# Patient Record
Sex: FEMALE | URBAN - METROPOLITAN AREA
[De-identification: names, ages, dates, MRNs, and addresses within clinical notes are randomized per-mention and may not be internally consistent; named-entity substitution may affect disease eponyms.]

---

## 2024-09-30 ENCOUNTER — TELEPHONE (OUTPATIENT)
Dept: MATERNAL FETAL MEDICINE | Facility: CLINIC | Age: 34
End: 2024-09-30
Payer: COMMERCIAL

## 2024-09-30 DIAGNOSIS — O09.529 ANTEPARTUM MULTIGRAVIDA OF ADVANCED MATERNAL AGE: Primary | ICD-10-CM

## 2024-10-15 DIAGNOSIS — O09.529 ANTEPARTUM MULTIGRAVIDA OF ADVANCED MATERNAL AGE: Primary | ICD-10-CM

## 2024-10-21 ENCOUNTER — TELEPHONE (OUTPATIENT)
Dept: MATERNAL FETAL MEDICINE | Facility: CLINIC | Age: 34
End: 2024-10-21
Payer: COMMERCIAL

## 2024-10-21 PROBLEM — O09.522 MULTIGRAVIDA OF ADVANCED MATERNAL AGE IN SECOND TRIMESTER: Status: ACTIVE | Noted: 2024-10-21

## 2024-10-21 PROBLEM — O09.90 AT HIGH RISK FOR COMPLICATIONS OF INTRAUTERINE PREGNANCY (IUP): Status: ACTIVE | Noted: 2024-10-21

## 2024-10-21 PROBLEM — O99.212 SEVERE OBESITY DUE TO EXCESS CALORIES AFFECTING PREGNANCY IN SECOND TRIMESTER: Status: ACTIVE | Noted: 2024-10-21

## 2024-10-21 PROBLEM — E66.01 SEVERE OBESITY DUE TO EXCESS CALORIES AFFECTING PREGNANCY IN SECOND TRIMESTER: Status: ACTIVE | Noted: 2024-10-21

## 2024-10-21 PROBLEM — O09.42 GRAND MULTIPARITY WITH CURRENT PREGNANCY IN SECOND TRIMESTER: Status: ACTIVE | Noted: 2024-10-21

## 2024-10-21 NOTE — PROGRESS NOTES
Maternal Fetal Medicine New Consult    Subjective:     Patient ID: [id number]    Chief Complaint: MFM Consult w/us  (For AMA )      HPI: 33 y.o.  female  at 13w6d gestation with Estimated Date of Delivery: 25 by LMP, consistent with early US. She is sent for MFM consultation for AMA.     She is of advanced maternal age and will be 34 by the PRANAV.  Patient has regular cycles that are relatively short no more than 28 days.  She had elevated BMI of 37 at initial OB visit.  She is grand multiparous with 4 previous term vaginal deliveries.  She denies any history of postpartum hemorrhage and has never needed a blood transfusion.  Patient was accompanied by .       She denies any personal or family history of aneuploidy or anomalies. She denies any exposure to high fevers, viral rashes, illicit drugs or alcohol in this pregnancy.  She denies any leaking fluid, vaginal bleeding, contractions, decreased fetal movement. Denies headaches, visual disturbances, or epigastric pain.       Pregnancy complications include:  Patient Active Problem List   Diagnosis    Multigravida of advanced maternal age in second trimester    Elevated BMI over 35 affecting pregnancy in second trimester    At high risk for complications of intrauterine pregnancy (IUP)    Grand multiparity with current pregnancy in second trimester       Past Medical History:   Diagnosis Date    Anemia     2014    Anxiety disorder 2014    Postpartum depression     , 2019,       Past Surgical History:   Procedure Laterality Date    ANTERIOR CRUCIATE LIGAMENT REPAIR      BIOPSY OF TONSILS  2008       Family History   Problem Relation Name Age of Onset    Stroke Maternal Grandmother      Diabetes Father      Hypertension Mother      Breast cancer Mother         Social History     Socioeconomic History    Marital status:    Tobacco Use    Smoking status: Never     Passive exposure: Current    Smokeless tobacco: Never   Substance and  "Sexual Activity    Alcohol use: Not Currently    Drug use: Never    Sexual activity: Yes     Partners: Male       Current Outpatient Medications   Medication Sig Dispense Refill    EScitalopram oxalate (LEXAPRO) 20 MG tablet Take 20 mg by mouth.      ondansetron (ZOFRAN-ODT) 4 MG TbDL SMARTSI-2 Tablet(s) Sublingual Every 8 Hours PRN      prenatal no115/iron/folic acid (PRENATAL 19 ORAL) Take by mouth.      aspirin (ECOTRIN) 81 MG EC tablet Take 1 tablet (81 mg total) by mouth 2 (two) times a day. Start after 12 weeks gestation. 60 tablet 10     No current facility-administered medications for this visit.       Review of patient's allergies indicates:   Allergen Reactions    Penicillin Hives        Review of Systems   12 point review of systems conducted, negative except as stated in the history of present illness. See HPI for details.      Objective:     Visit Vitals  /70 (BP Location: Left arm, Patient Position: Sitting)   Pulse 83   Ht 5' 2" (1.575 m)   Wt 90.7 kg (200 lb)   LMP 2024 (Exact Date)   BMI 36.58 kg/m²        Physical Exam  Vitals and nursing note reviewed.   Constitutional:       General: She is not in acute distress.     Appearance: Normal appearance.      Comments: Increased BMI   HENT:      Head: Normocephalic and atraumatic.      Nose: Nose normal. No congestion.      Mouth/Throat:      Pharynx: Oropharynx is clear.   Eyes:      General: No scleral icterus.     Conjunctiva/sclera: Conjunctivae normal.   Cardiovascular:      Rate and Rhythm: Normal rate and regular rhythm.   Pulmonary:      Effort: No respiratory distress.      Breath sounds: Normal breath sounds. No wheezing.   Abdominal:      General: Abdomen is flat.      Palpations: Abdomen is soft.      Tenderness: There is no abdominal tenderness. There is no right CVA tenderness, left CVA tenderness or guarding.      Comments: No CVA tenderness gravid uterus.    Musculoskeletal:         General: Normal range of motion.      " Cervical back: Neck supple.      Right lower leg: No edema.      Left lower leg: No edema.   Skin:     General: Skin is warm.      Findings: No bruising or rash.   Neurological:      General: No focal deficit present.      Mental Status: She is oriented to person, place, and time.      Deep Tendon Reflexes: Reflexes normal.      Comments: Normal reflexes   Psychiatric:         Mood and Affect: Mood normal.         Behavior: Behavior normal.         Thought Content: Thought content normal.         Judgment: Judgment normal.         Assessment/Plan:     33 y.o.  female with IUP at 13w6d    Advanced maternal age  Viable IUP with measurements consistent with established dating on 10/22/24.     I discussed with her that the higher risk of aneuploidy related to advanced maternal age is caused by meiotic nondysjunction.  At this maternal  age, the risk of Down syndrome quoted at 1:353 and the risk of any chromosomal problems quoted at 1:178. She would not consider termination of pregnancy even if anomalies or aneuploidy is detected. She was advised of the screening nature of the Level 2 ultrasound as well as the screening nature of a quad screen to check for the risk of aneuploidy with normal ultrasound and or quad screen testing that would lower the background risk of aneuploidy.        She was counseled on Cell free DNA understanding that it is an enhanced screening test but not a diagnostic test. It assesses risk for common aneuploidies such as Trisomy 13, 18, and 21 by evaluating cell-free fetal DNA in maternal circulation with a false positive rate less than 0.5% for Trisomy 21 and less reliable for Trisomy 13 and Trisomy 18. She declined cell free DNA .      She was advised that the only diagnostic test at this time is genetic amniocentesis.  Benefits and risks of a genetic amniocentesis were discussed. Patient was offered genetic amniocentesis, after thorough counseling on benefits and risks of procedure,  with very remote risk of complications and in some studies no identifiable increased risk, above background risk of spontaneous miscarriage, while some current data suggests risk up to 1 in 800 with early amniocentesis prior to 24 weeks, and remote risk of need for emergency delivery with all the complications of prematurity with amniocentesis after 24 weeks.     I also discussed with them that cell free DNA will not detect other genetic diseases or more subtle chromosomal abnormalities like microdeletions or duplications. The added benefit of doing chromosomal microarray analysis on amniotic fluid is that it would detect clinically relevant deletions or duplications in about 1:60 (1.7%) when the indication is abnormal quad screen or advanced maternal age, and 6.0 % when a structural anomaly is present. The concern about microarray analysis is that it could give uncertain findings in about 1.5-2.0% of cases that would increase anxiety and may require specialized genetic counseling.     In a recent study from 2018, clinically significant chromosomal microarray aberrations were seen in 4.7% of pregnancies with US anomalies (excluding soft markers for aneuploidy), but including patients with isolated FGR and polyhydramnios. The most common abnormality seen with cardiovascular defects was 22q11.21 deletion (DiGeorge-velocardiofacial syndrome), and the most common abnormality among genitourinary malformations was 17q12 deletion (encompassing HFNF1B). Aberrations were present in 13.1% if multiple anomalies, and around 4 % if single ultrasonographic anomaly.     She declined amniocentesis . She is aware of the need for  evaluation.    The higher risk of anomalies associated with advanced maternal age was also addressed. The reassurance obtained by the normal ultrasound and the limitations of ultrasound in checking for anomalies was explained with the need for regular  evaluations.     I recommend targeted  anatomical ultrasound at 18-20 weeks, and fetal growth ultrasound around 32-34 weeks if no other obstetrical risk factors and fetal anatomy scan complete. She was advised to do fetal kick counts 3 times daily and PRN after 24 weeks, with immediate reporting of decreased fetal movements (<10 movements/hr).    Consider delivery at 39-40 weeks, but no later than 40 weeks 6 days.       Elevated BMI  Body mass index is 36.58 kg/m².    I discussed the risk of miscarriage in first trimester, recurrent miscarriages, congenital anomalies, hypertension, diabetes,  labor and the higher risk of  section and the higher risk of fetal demise in-utero. There is also higher risk of for excessive fetal weight and large for gestational age (LGA) fetuses. Mothers with LGA fetuses are at higher risk of prolonged labor,  delivery, shoulder dystocia and birth trauma. LGA neonates are increased risk of fetal hypoxia and intrauterine death, and are at risk to develop diabetes, obesity, metabolic syndrome, asthma and cancer later in life. She was advised of the importance of eating healthy and limiting weight gain to 11-20 lbs during the pregnancy, as optimal in this situation. I recommended low calorie, low fat diet avoiding any additional excessive weight gain. Excess weight gain would be associated with gestational hypertension, gestational diabetes and adverse  outcomes, including fetal demise in utero.    Due to the lack of consistent evidence to indicate  and maternal benefit from early diagnosis and treatment of gestational diabetes mellitus (GDM), the American College of Obstetricians and Gynecologists (ACOG) does not recommend routine screening for GDM before 24 weeks of gestation. ACOG continues to recommend screening for pregestational diabetes mellitus before 24 weeks of gestation, preferably at the onset of prenatal care, in patients with risk factors.    I recommend 1 hour GCT between  24-28 weeks' gestation.    With elevated BMI, start fetal testing starting around 37 weeks.       Importance of working on losing weight after the pregnancy is over, especially before a future pregnancy was discussed. Breastfeeding may be an important tool in reducing the postpartum weight retention. Fetal risks were discussed with short term risk of fetal/ obesity and long term risk of adolescent component of metabolic syndrome.       Grand multiparity  She was advised of the increased risk of malpresentation, abnormal labors, and risk of postpartum hemorrhage and expectant management needs to be done. Consider using prophylactic dose of uterotonic agents after delivery to reduce the risk of postpartum hemorrhage.    Recommend flagging the chart on admission as high risk for postpartum hemorrhage to have blood type and screened and be prepared in the event that that happens.       Preeclampsia prophylaxis  With her risk factors for preeclampsia including  BMI over 30 and maternal age 35 years or older, discussed recommendations for low dose aspirin use to decrease risks for adverse outcomes, including preeclampsia, low birth weight and  delivery. Low-dose aspirin reduced the risk for preeclampsia by 15% in clinical trials and reduced the risk for  birth by 20% and FGR by 20%, and  mortality by around 20%.  After discussing risks/benefits of its use, it was agreed to start asa 81 mg BID, due to multiple risk factors for preeclampsia. After discussing benefits (more effective than daily) vs potential risks (less data on safety with use at delivery), she agreed to start low dose aspirin twice daily and continue until 34 6/7weeks, then decrease to once daily until delivery.. Also, recommend using in all future pregnancies.    Reviewed dating criteria with the patient and with a 5 day discrepancy at 8 weeks consistent with today's measurements, and with a history of short cycles, agreed to  use ultrasound assigned EDC of April 23, 2025.  Discussed with Dr. Huntley who agrees.  Genetic counseling regarding advanced maternal age done.  Patient was not interested in any genetic testing at this time.  We will come for a level 2 scan in about 6 weeks.  After counseling on aspirin daily versus twice a day agreed to do b.i.d. till 34 weeks and then decrease after that to daily.  Importance of healthy diet and proper weight gain recommendations discussed.  Exercise options reviewed and encouraged continued exercise, with the patient uses stationary bike at this time    Follow up in about 6 weeks (around 12/3/2024) for [facility] follow-up, Level 2 scan.     Future Appointments   Date Time Provider Department Center   12/3/2024  8:15 AM ROOM 3, [facility] [place]   12/3/2024  8:30 AM Reginald Gross MD [facility] [place]          Patient was evaluated by LICHA Hillman and Dr. Gross.  Final assessment and recommendations as stated above were made by Dr. Gross.    This note was created with the assistance of [employer] voice recognition software. There may be transcription errors as a result of using this technology, however minimal. Effort has been made to ensure accuracy of transcription, but any obvious errors or omissions should be clarified with the author of the document.

## 2024-10-22 ENCOUNTER — PROCEDURE VISIT (OUTPATIENT)
Dept: MATERNAL FETAL MEDICINE | Facility: CLINIC | Age: 34
End: 2024-10-22
Payer: COMMERCIAL

## 2024-10-22 ENCOUNTER — OFFICE VISIT (OUTPATIENT)
Dept: MATERNAL FETAL MEDICINE | Facility: CLINIC | Age: 34
End: 2024-10-22
Payer: COMMERCIAL

## 2024-10-22 VITALS
SYSTOLIC BLOOD PRESSURE: 113 MMHG | HEART RATE: 83 BPM | DIASTOLIC BLOOD PRESSURE: 70 MMHG | HEIGHT: 62 IN | WEIGHT: 200 LBS | BODY MASS INDEX: 36.8 KG/M2

## 2024-10-22 DIAGNOSIS — O09.529 ANTEPARTUM MULTIGRAVIDA OF ADVANCED MATERNAL AGE: ICD-10-CM

## 2024-10-22 DIAGNOSIS — O99.212 SEVERE OBESITY DUE TO EXCESS CALORIES AFFECTING PREGNANCY IN SECOND TRIMESTER: ICD-10-CM

## 2024-10-22 DIAGNOSIS — O09.522 MULTIGRAVIDA OF ADVANCED MATERNAL AGE IN SECOND TRIMESTER: Primary | ICD-10-CM

## 2024-10-22 DIAGNOSIS — O09.42 GRAND MULTIPARITY WITH CURRENT PREGNANCY IN SECOND TRIMESTER: ICD-10-CM

## 2024-10-22 DIAGNOSIS — E66.01 SEVERE OBESITY DUE TO EXCESS CALORIES AFFECTING PREGNANCY IN SECOND TRIMESTER: ICD-10-CM

## 2024-10-22 DIAGNOSIS — O09.90 AT HIGH RISK FOR COMPLICATIONS OF INTRAUTERINE PREGNANCY (IUP): ICD-10-CM

## 2024-10-22 RX ORDER — ESCITALOPRAM OXALATE 20 MG/1
20 TABLET ORAL
COMMUNITY
Start: 2024-06-17

## 2024-10-22 RX ORDER — ASPIRIN 81 MG/1
81 TABLET ORAL 2 TIMES DAILY
Qty: 60 TABLET | Refills: 10 | Status: SHIPPED | OUTPATIENT
Start: 2024-10-22

## 2024-10-22 RX ORDER — ONDANSETRON 4 MG/1
TABLET, ORALLY DISINTEGRATING ORAL
COMMUNITY
Start: 2024-07-09

## 2024-10-22 RX ORDER — ASPIRIN 81 MG/1
81 TABLET ORAL DAILY
Qty: 30 TABLET | Refills: 10 | Status: SHIPPED | OUTPATIENT
Start: 2024-10-22 | End: 2024-10-22

## 2024-11-11 DIAGNOSIS — E66.01 SEVERE OBESITY DUE TO EXCESS CALORIES AFFECTING PREGNANCY IN SECOND TRIMESTER: ICD-10-CM

## 2024-11-11 DIAGNOSIS — O09.529 ANTEPARTUM MULTIGRAVIDA OF ADVANCED MATERNAL AGE: Primary | ICD-10-CM

## 2024-11-11 DIAGNOSIS — O99.212 SEVERE OBESITY DUE TO EXCESS CALORIES AFFECTING PREGNANCY IN SECOND TRIMESTER: ICD-10-CM

## 2024-11-19 DIAGNOSIS — O09.522 MULTIGRAVIDA OF ADVANCED MATERNAL AGE IN SECOND TRIMESTER: Primary | ICD-10-CM

## 2024-11-21 ENCOUNTER — TELEPHONE (OUTPATIENT)
Dept: MATERNAL FETAL MEDICINE | Facility: CLINIC | Age: 34
End: 2024-11-21
Payer: COMMERCIAL

## 2024-11-21 PROBLEM — O98.519 PARVOVIRUS B19 INFECTION COMPLICATING PREGNANCY: Status: ACTIVE | Noted: 2024-11-21

## 2024-11-21 PROBLEM — B34.3 PARVOVIRUS B19 INFECTION COMPLICATING PREGNANCY: Status: ACTIVE | Noted: 2024-11-21

## 2024-11-21 NOTE — TELEPHONE ENCOUNTER
Pt stated that she does not plan on going due to her glucose test result from Dr Huntley that is coming in next week.

## 2024-11-21 NOTE — PROGRESS NOTES
Maternal Fetal Medicine Follow Up    Subjective:     Patient ID: [id number]    Chief Complaint: M follow up with US      HPI: Amira Kebede is a 34 y.o. female  at 18w2d gestation with Estimated Date of Delivery: 25  who is here for follow-up consultation by MFM.    She is of advanced maternal age and will be 34 by the PRANAV.  She declined any genetic testing during the pregnancy.  She had elevated BMI of 37 at initial OB visit.  She is grand multiparous with 4 previous term vaginal deliveries.  She denies any history of postpartum hemorrhage and has never needed a blood transfusion.  She is on low-dose aspirin BID for preeclampsia prophylaxis.  Patient had positive parvovirus B19 IgG and IgM on 2024.  Patient has had symptoms of the end of the closure and does have resolved.  She reports 2 of her children were diagnosed with with 5th disease around that time.  She denies any symptoms at this time.       Interval history since last MFM visit: None.. She denies any leaking fluid, vaginal bleeding, contractions, decreased fetal movement. Denies headaches, visual disturbances, or epigastric pain.    Pregnancy complications include:   Patient Active Problem List   Diagnosis    Multigravida of advanced maternal age in second trimester    Elevated BMI over 35 affecting pregnancy in second trimester    At high risk for complications of intrauterine pregnancy (IUP)    Grand multiparity with current pregnancy in second trimester    Parvovirus B19 infection complicating pregnancy       No changes to medical, surgical, family, social, or obstetric history.    Medications:  Current Outpatient Medications   Medication Instructions    aspirin (ECOTRIN) 81 mg, Oral, 2 times daily, Start after 12 weeks gestation.    EScitalopram oxalate (LEXAPRO) 20 mg    ondansetron (ZOFRAN-ODT) 4 MG TbDL SMARTSI-2 Tablet(s) Sublingual Every 8 Hours PRN    prenatal no115/iron/folic acid (PRENATAL 19 ORAL) Take by mouth.  "      Review of Systems   12 point review of systems conducted, negative except as stated in the history of present illness. See HPI for details.      Objective:     Visit Vitals  /68 (BP Location: Left arm, Patient Position: Sitting)   Pulse 77   Ht 5' 2" (1.575 m)   Wt 92.5 kg (204 lb)   LMP 2024 (Exact Date)   BMI 37.31 kg/m²        Physical Exam  Vitals and nursing note reviewed.   Constitutional:       Appearance: Normal appearance.      Comments: Increased BMI   HENT:      Head: Normocephalic and atraumatic.      Nose: Nose normal. No congestion.   Cardiovascular:      Rate and Rhythm: Normal rate.   Pulmonary:      Effort: Pulmonary effort is normal.   Skin:     Findings: No rash.   Neurological:      Mental Status: She is alert and oriented to person, place, and time.   Psychiatric:         Mood and Affect: Mood normal.         Behavior: Behavior normal.         Thought Content: Thought content normal.         Judgment: Judgment normal.         Assessment/Plan:     34 y.o.  female with IUP at 18w2d    Advanced maternal age  Viable IUP with measurements consistent with established dating on 10/22/24.   Normal MCA Doppler today 2024.    Reviewed risks with AMA, including risks for PTL, FGR, anomalies not seen, aneuploidy and stillbirth at term. She previously declined amniocentesis . She is aware of need for  evaluation. She previously declined cell free DNA .    I recommend targeted anatomical ultrasound at 18-20 weeks, and fetal growth ultrasound around 32-34 weeks if no other obstetrical risk factors and fetal anatomy scan complete. She was advised to do fetal kick counts 3 times daily and PRN after 24 weeks, with immediate reporting of decreased fetal movements (<10 movements/hr).    Consider delivery at 39-40 weeks, but no later than 40 weeks 6 days.       Parvovirus B19 in pregnancy  Normal MCA Doppler today 2024.    Acute infection with parvovirus during the first " half of pregnancy is associated with spontaneous  (8-17% <20 weeks, and 2-6% after 20 weeks), but not with congenital anomalies. Acute infection is also associated with fetal anemia and hydrops fetalis, though the overall incidence is low. Additionally, most cases of fetal infection resolve spontaneously with no adverse outcomes. Most maternal infections are mild, with most individuals recovering completely from parvovirus B19 infection while requiring only supportive care during the acute phase.    Testing should be performed for patients with symptoms consistent with parvovirus B19 infection or for those with exposure to suspected or confirmed acute infections. Confirmatory testing for fetal infection involves PCR testing for parvovirus B19 DNA of the amniotic fluid. Diagnosis of fetal anemia is made via ultrasound assessment of the peak systolic velocity of the middle cerebral artery. Fetal anemia may be definitively diagnosed and treated through cordocentesis and intrauterine transfusion. Long-term neurodevelopmental outcomes are uncertain in infants with fetal parvovirus B19 infection. However, in the setting of fetal hydrops, the risk of fetal demise and abnormal neurodevelopmental outcomes are likely higher, even when appropriately managed.    Recommendations:  With her IgM+, she should be monitored for fetal infection (see below)  Amniocentesis can be performed for confirmation of fetal infection in cases where maternal infection history is unclear, although this is rarely necessary.  Discussed risks and benefits.  If performed - qualitative parvovirus B19 PCR is recommended.  Patient declined amniocentesis.  Weekly/Biweekly ultrasound surveillance with MCA PSV assessment for 10-12 weeks post exposure.  If no evidence of fetal anemia or hydrops after the period of assessment, return to routine obstetric care is appropriate.  If MCA dopplers are elevated, PUBS may be considered and further  recommendations will be made at that time      Elevated BMI  Body mass index is 37.31 kg/m². With  4 lb gain since last visit, she was advised to follow a healthy low caloric diet. Excess weight gain would be associated with gestational hypertension, gestational diabetes and adverse  outcomes, including fetal demise in utero.    It is important to lose weight after the pregnancy is over, especially before a future pregnancy was discussed. Breastfeeding may be an important tool in reducing the postpartum weight retention. Fetal risks were discussed with short term risk of fetal/ obesity and long term risk of adolescent component of metabolic syndrome.    I recommend 1 hour GCT between 24-28 weeks' gestation.    With elevated BMI, start fetal testing starting around 37 weeks. She was advised to do fetal kick counts 3 times daily and PRN after 24 weeks, with immediate reporting of decreased fetal movements (<10 movements/hr).       Grand multiparity  She was advised of the increased risk of malpresentation, abnormal labors, and risk of postpartum hemorrhage and expectant management needs to be done. Consider using prophylactic dose of uterotonic agents after delivery to reduce the risk of postpartum hemorrhage.    Recommend flagging the chart on admission as high risk for postpartum hemorrhage to have blood type and screened and be prepared in the event that that happens.       Preeclampsia prophylaxis  With her increased risk for preeclampsia, she agreed to continue asa 81 mg BID until 34 6/7 weeks, then decrease to once daily until delivery. Preeclampsia precautions reviewed.       Follow up in about 2 weeks (around 2024) for MCA doppler, Level 2 scan, keep return appointment add MCA to next ultrasound.     Future Appointments   Date Time Provider Department Center   12/3/2024  8:15 AM ROOM 3, [facility]   12/3/2024  8:30 AM Reginald Gross MD [facility] [place]         Patient was counseled of the risk of fetal infection risk of fetal anemia as discussed above.  We will follow MCA Dopplers.  MCA Doppler was normal today and will rechecked again 11 days.  Options of amniocentesis discussed and politely declined at this time.    RANGEL involvement: Patient was evaluated by LICHA Hillman and Dr. Gross.  Final assessment and recommendations as stated above were made by Dr. Gross.    This note was created with the assistance of [employer] voice recognition software. There may be transcription errors as a result of using this technology, however minimal. Effort has been made to ensure accuracy of transcription, but any obvious errors or omissions should be clarified with the author of the document.

## 2024-11-22 ENCOUNTER — PROCEDURE VISIT (OUTPATIENT)
Dept: MATERNAL FETAL MEDICINE | Facility: CLINIC | Age: 34
End: 2024-11-22
Payer: COMMERCIAL

## 2024-11-22 ENCOUNTER — OFFICE VISIT (OUTPATIENT)
Dept: MATERNAL FETAL MEDICINE | Facility: CLINIC | Age: 34
End: 2024-11-22
Payer: COMMERCIAL

## 2024-11-22 VITALS
HEIGHT: 62 IN | HEART RATE: 77 BPM | DIASTOLIC BLOOD PRESSURE: 68 MMHG | SYSTOLIC BLOOD PRESSURE: 106 MMHG | WEIGHT: 204 LBS | BODY MASS INDEX: 37.54 KG/M2

## 2024-11-22 DIAGNOSIS — O09.522 MULTIGRAVIDA OF ADVANCED MATERNAL AGE IN SECOND TRIMESTER: ICD-10-CM

## 2024-11-22 DIAGNOSIS — O99.212 SEVERE OBESITY DUE TO EXCESS CALORIES AFFECTING PREGNANCY IN SECOND TRIMESTER: ICD-10-CM

## 2024-11-22 DIAGNOSIS — O09.529 ANTEPARTUM MULTIGRAVIDA OF ADVANCED MATERNAL AGE: ICD-10-CM

## 2024-11-22 DIAGNOSIS — O09.42 GRAND MULTIPARITY WITH CURRENT PREGNANCY IN SECOND TRIMESTER: ICD-10-CM

## 2024-11-22 DIAGNOSIS — B34.3 PARVOVIRUS B19 INFECTION COMPLICATING PREGNANCY: Primary | ICD-10-CM

## 2024-11-22 DIAGNOSIS — E66.01 SEVERE OBESITY DUE TO EXCESS CALORIES AFFECTING PREGNANCY IN SECOND TRIMESTER: ICD-10-CM

## 2024-11-22 DIAGNOSIS — O98.519 PARVOVIRUS B19 INFECTION COMPLICATING PREGNANCY: Primary | ICD-10-CM

## 2024-11-27 NOTE — PROGRESS NOTES
Maternal Fetal Medicine Follow Up    Subjective:     Patient ID: [id number]    Chief Complaint: mfm followup w/us      HPI: Amira Kebede is a 34 y.o. female  at 19w6d gestation with Estimated Date of Delivery: 25  who is here for follow-up consultation by M.    She is of advanced maternal age and will be 34 by the PRANAV.  She declined any genetic testing during the pregnancy.  She had elevated BMI of 37 at initial OB visit.  She is grand multiparous with 4 previous term vaginal deliveries.  She denies any history of postpartum hemorrhage and has never needed a blood transfusion.  She is on low-dose aspirin BID for preeclampsia prophylaxis. She had positive parvovirus B19 IgG and IgM on 2024.  She was exposed to her 4 children who had Fifths Disease in mid October, and she had symptoms at the end of October that have resolved. She denies any symptoms at this time.        Interval history since last MFM visit: None.. She denies any leaking fluid, vaginal bleeding, contractions, decreased fetal movement. Denies headaches, visual disturbances, or epigastric pain.    Pregnancy complications include:   Patient Active Problem List   Diagnosis    Multigravida of advanced maternal age in second trimester    Elevated BMI over 35 affecting pregnancy in second trimester    At high risk for complications of intrauterine pregnancy (IUP)    Grand multiparity with current pregnancy in second trimester    Parvovirus B19 infection complicating pregnancy       No changes to medical, surgical, family, social, or obstetric history.    Medications:  Current Outpatient Medications   Medication Instructions    aspirin (ECOTRIN) 81 mg, Oral, 2 times daily, Start after 12 weeks gestation.    EScitalopram oxalate (LEXAPRO) 20 mg    ondansetron (ZOFRAN-ODT) 4 MG TbDL SMARTSI-2 Tablet(s) Sublingual Every 8 Hours PRN    prenatal no115/iron/folic acid (PRENATAL 19 ORAL) Take by mouth.       Review of Systems   12 point review  "of systems conducted, negative except as stated in the history of present illness. See HPI for details.      Objective:     Visit Vitals  /60 (BP Location: Left arm, Patient Position: Sitting)   Pulse 84   Ht 5' 2" (1.575 m)   Wt 92.3 kg (203 lb 6.4 oz)   LMP 2024 (Exact Date)   BMI 37.20 kg/m²        Physical Exam  Vitals and nursing note reviewed.   Constitutional:       Appearance: Normal appearance.      Comments: Increased BMI   HENT:      Head: Normocephalic and atraumatic.      Nose: Nose normal. No congestion.   Cardiovascular:      Rate and Rhythm: Normal rate.   Pulmonary:      Effort: Pulmonary effort is normal.   Skin:     Findings: No rash.   Neurological:      Mental Status: She is alert and oriented to person, place, and time.   Psychiatric:         Mood and Affect: Mood normal.         Behavior: Behavior normal.         Thought Content: Thought content normal.         Judgment: Judgment normal.         Assessment/Plan:     34 y.o.  female with IUP at 19w6d    Advanced maternal age  There is normal fetal growth and no anomalies seen on fetal anatomy scan on 12/3/24.  AFV is normal.   Normal MCA Doppler today 2024.    Reviewed risks with AMA, including risks for PTL, FGR, anomalies not seen, aneuploidy and stillbirth at term. She previously declined amniocentesis . She is aware of need for  evaluation. She previously declined cell free DNA .    I recommend fetal growth ultrasound around 32-34 weeks if no other obstetrical risk factors and fetal anatomy scan complete. She was advised to do fetal kick counts 3 times daily and PRN after 24 weeks, with immediate reporting of decreased fetal movements (<10 movements/hr).    Consider delivery at 39-40 weeks, but no later than 40 weeks 6 days.       Parvovirus B19 in pregnancy  Normal MCA Doppler today 2024.    Patient declined amniocentesis to check for fetal infection.    We will plan to continue ultrasound " surveillance with MCA PSV assessment for 10-12 weeks post exposure, next scheduled in 2 weeks. If no evidence of fetal anemia or hydrops after the period of assessment, return to routine obstetric care is appropriate. If MCA dopplers are elevated, PUBS may be considered and further recommendations will be made at that time      Elevated BMI  Body mass index is 37.2 kg/m². With  4 lb gain since last visit, she was advised to follow a healthy low caloric diet. Excess weight gain would be associated with gestational hypertension, gestational diabetes and adverse  outcomes, including fetal demise in utero.    It is important to lose weight after the pregnancy is over, especially before a future pregnancy was discussed. Breastfeeding may be an important tool in reducing the postpartum weight retention. Fetal risks were discussed with short term risk of fetal/ obesity and long term risk of adolescent component of metabolic syndrome.    I recommend 1 hour GCT between 24-28 weeks' gestation.    With elevated BMI, start fetal testing starting around 37 weeks. She was advised to do fetal kick counts 3 times daily and PRN after 24 weeks, with immediate reporting of decreased fetal movements (<10 movements/hr).       Grand multiparity  She was advised of the increased risk of malpresentation, abnormal labors, and risk of postpartum hemorrhage and expectant management needs to be done. Consider using prophylactic dose of uterotonic agents after delivery to reduce the risk of postpartum hemorrhage.    Recommend flagging the chart on admission as high risk for postpartum hemorrhage to have blood type and screened and be prepared in the event that that happens.       Preeclampsia prophylaxis  With her increased risk for preeclampsia, she agreed to continue asa 81 mg BID until 34 6/7 weeks, then decrease to once daily until delivery. Preeclampsia precautions reviewed. \    No anomalies were seen today.  We will plan  to see her again in 2 weeks to check MCA Doppler again.  Still no evidence of significant fetal anemia with fetal hydrops.    Follow up in about 2 weeks (around 12/17/2024) for MFM follow-up, MCA doppler.     Future Appointments   Date Time Provider Department Center   12/17/2024  8:45 AM Reginald Gross MD [facility] [place]   12/17/2024  8:45 AM ROOM 3, [facility] [place]        RANGEL involvement: Patient was evaluated and examined by Dr. Gross. LICHA Hillman, helped in pre charting of part of note.    This note was created with the assistance of [email or web address] voice recognition software. There may be transcription errors as a result of using this technology, however minimal. Effort has been made to ensure accuracy of transcription, but any obvious errors or omissions should be clarified with the author of the document.

## 2024-12-03 ENCOUNTER — PROCEDURE VISIT (OUTPATIENT)
Dept: MATERNAL FETAL MEDICINE | Facility: CLINIC | Age: 34
End: 2024-12-03
Payer: COMMERCIAL

## 2024-12-03 ENCOUNTER — OFFICE VISIT (OUTPATIENT)
Dept: MATERNAL FETAL MEDICINE | Facility: CLINIC | Age: 34
End: 2024-12-03
Payer: COMMERCIAL

## 2024-12-03 VITALS
BODY MASS INDEX: 37.43 KG/M2 | WEIGHT: 203.38 LBS | SYSTOLIC BLOOD PRESSURE: 111 MMHG | HEART RATE: 84 BPM | DIASTOLIC BLOOD PRESSURE: 60 MMHG | HEIGHT: 62 IN

## 2024-12-03 DIAGNOSIS — O99.212 SEVERE OBESITY DUE TO EXCESS CALORIES AFFECTING PREGNANCY IN SECOND TRIMESTER: Primary | ICD-10-CM

## 2024-12-03 DIAGNOSIS — O09.522 MULTIGRAVIDA OF ADVANCED MATERNAL AGE IN SECOND TRIMESTER: ICD-10-CM

## 2024-12-03 DIAGNOSIS — O98.519 PARVOVIRUS B19 INFECTION COMPLICATING PREGNANCY: ICD-10-CM

## 2024-12-03 DIAGNOSIS — B34.3 PARVOVIRUS B19 INFECTION COMPLICATING PREGNANCY: ICD-10-CM

## 2024-12-03 DIAGNOSIS — E66.01 SEVERE OBESITY DUE TO EXCESS CALORIES AFFECTING PREGNANCY IN SECOND TRIMESTER: Primary | ICD-10-CM

## 2024-12-03 DIAGNOSIS — O09.42 GRAND MULTIPARITY WITH CURRENT PREGNANCY IN SECOND TRIMESTER: ICD-10-CM

## 2024-12-10 DIAGNOSIS — E66.01 SEVERE OBESITY DUE TO EXCESS CALORIES AFFECTING PREGNANCY IN SECOND TRIMESTER: ICD-10-CM

## 2024-12-10 DIAGNOSIS — B34.3 PARVOVIRUS B19 INFECTION COMPLICATING PREGNANCY: ICD-10-CM

## 2024-12-10 DIAGNOSIS — O99.212 SEVERE OBESITY DUE TO EXCESS CALORIES AFFECTING PREGNANCY IN SECOND TRIMESTER: ICD-10-CM

## 2024-12-10 DIAGNOSIS — O09.522 MULTIGRAVIDA OF ADVANCED MATERNAL AGE IN SECOND TRIMESTER: Primary | ICD-10-CM

## 2024-12-10 DIAGNOSIS — O98.519 PARVOVIRUS B19 INFECTION COMPLICATING PREGNANCY: ICD-10-CM

## 2024-12-19 NOTE — PROGRESS NOTES
Maternal Fetal Medicine Follow Up    Subjective:     Patient ID: [id number]    Chief Complaint: M follow up with US      HPI: Amira Kebede is a 34 y.o. female  at 22w2d gestation with Estimated Date of Delivery: 25  who is here for follow-up consultation by MFM.    She is of advanced maternal age and will be 34 by the PRANAV.  She declined any genetic testing during the pregnancy.  She had elevated BMI of 37 at initial OB visit.  She is grand multiparous with 4 previous term vaginal deliveries.  She denies any history of postpartum hemorrhage and has never needed a blood transfusion.  She is on low-dose aspirin BID for preeclampsia prophylaxis. She had positive parvovirus B19 IgG and IgM on 2024.  She was exposed to her 4 children who had Fifths Disease in mid October, and she had symptoms at the end of October that have resolved. She denies any symptoms at this time.        Interval history since last MFM visit: None.. She denies any leaking fluid, vaginal bleeding, contractions, decreased fetal movement. Denies headaches, visual disturbances, or epigastric pain.    Pregnancy complications include:   Patient Active Problem List   Diagnosis    Multigravida of advanced maternal age in second trimester    Elevated BMI over 35 affecting pregnancy in second trimester    At high risk for complications of intrauterine pregnancy (IUP)    Grand multiparity with current pregnancy in second trimester    Parvovirus B19 infection complicating pregnancy       No changes to medical, surgical, family, social, or obstetric history.    Medications:  Current Outpatient Medications   Medication Instructions    aspirin (ECOTRIN) 81 mg, Oral, 2 times daily, Start after 12 weeks gestation.    EScitalopram oxalate (LEXAPRO) 20 mg    ondansetron (ZOFRAN-ODT) 4 MG TbDL SMARTSI-2 Tablet(s) Sublingual Every 8 Hours PRN    prenatal no115/iron/folic acid (PRENATAL 19 ORAL) Take by mouth.       Review of Systems   12 point  "review of systems conducted, negative except as stated in the history of present illness. See HPI for details.      Objective:     Visit Vitals  /68 (BP Location: Left arm, Patient Position: Sitting)   Pulse 76   Ht 5' 2" (1.575 m)   Wt 92.5 kg (204 lb)   LMP 2024 (Exact Date)   BMI 37.31 kg/m²        Physical Exam  Vitals and nursing note reviewed.   Constitutional:       Appearance: Normal appearance.      Comments: Increased BMI   HENT:      Head: Normocephalic and atraumatic.      Nose: Nose normal. No congestion.   Cardiovascular:      Rate and Rhythm: Normal rate.   Pulmonary:      Effort: Pulmonary effort is normal.   Skin:     Findings: No rash.   Neurological:      Mental Status: She is alert and oriented to person, place, and time.   Psychiatric:         Mood and Affect: Mood normal.         Behavior: Behavior normal.         Thought Content: Thought content normal.         Judgment: Judgment normal.         Assessment/Plan:     34 y.o.  female with IUP at 22w2d    Advanced maternal age  Normal fetal growth and no anomalies seen on fetal anatomy scan on 12/3/24.  AFV is normal.   Normal MCA Doppler today 2024.    Reviewed risks with AMA, including risks for PTL, FGR, anomalies not seen, aneuploidy and stillbirth at term. She previously declined amniocentesis . She is aware of need for  evaluation. She previously declined cell free DNA .    I recommend fetal growth ultrasound around 32-34 weeks if no other obstetrical risk factors and fetal anatomy scan complete. She was advised to do fetal kick counts 3 times daily and PRN after 24 weeks, with immediate reporting of decreased fetal movements (<10 movements/hr).    Consider delivery at 39-40 weeks, but no later than 40 weeks 6 days.       Parvovirus B19 in pregnancy  Normal MCA Doppler today 2024.    Patient declined amniocentesis to check for fetal infection.    We will plan to continue ultrasound surveillance with " MCA PSV assessment for 10-12 weeks post exposure, next scheduled in 2 weeks. If no evidence of fetal anemia or hydrops after the period of assessment, return to routine obstetric care is appropriate. If MCA dopplers are elevated, PUBS may be considered and further recommendations will be made at that time      Elevated BMI  Body mass index is 37.31 kg/m². With  4 lb gain since last visit, she was advised to follow a healthy low caloric diet. Excess weight gain would be associated with gestational hypertension, gestational diabetes and adverse  outcomes, including fetal demise in utero.    It is important to lose weight after the pregnancy is over, especially before a future pregnancy was discussed. Breastfeeding may be an important tool in reducing the postpartum weight retention. Fetal risks were discussed with short term risk of fetal/ obesity and long term risk of adolescent component of metabolic syndrome.    I recommend 1 hour GCT between 24-28 weeks' gestation.    With elevated BMI, start fetal testing starting around 37 weeks. She was advised to do fetal kick counts 3 times daily and PRN after 24 weeks, with immediate reporting of decreased fetal movements (<10 movements/hr).       Grand multiparity  She was advised of the increased risk of malpresentation, abnormal labors, and risk of postpartum hemorrhage and expectant management needs to be done. Consider using prophylactic dose of uterotonic agents after delivery to reduce the risk of postpartum hemorrhage.    Recommend flagging the chart on admission as high risk for postpartum hemorrhage to have blood type and screened and be prepared in the event that that happens.       Preeclampsia prophylaxis  With her increased risk for preeclampsia, she agreed to continue asa 81 mg BID until 34 6/7 weeks, then decrease to once daily until delivery. Preeclampsia precautions reviewed.      MCA Doppler is normal.  We will plan to recheck again in 2  weeks.  Continue aspirin b.I.d..    Follow up in about 2 weeks (around 1/3/2025) for [facility] follow-up, MCA doppler.     Future Appointments   Date Time Provider Department Center   1/3/2025  8:00 AM ROOM 1, [facility] [place]   1/3/2025  8:30 AM Reginald Gross MD [facility] [place]       RANGEL involvement: Patient was evaluated and examined by Dr. Gross. LICHA Hillman, helped in pre charting of part of note.    This note was created with the assistance of [employer] voice recognition software. There may be transcription errors as a result of using this technology, however minimal. Effort has been made to ensure accuracy of transcription, but any obvious errors or omissions should be clarified with the author of the document.

## 2024-12-20 ENCOUNTER — PROCEDURE VISIT (OUTPATIENT)
Dept: MATERNAL FETAL MEDICINE | Facility: CLINIC | Age: 34
End: 2024-12-20
Payer: COMMERCIAL

## 2024-12-20 ENCOUNTER — OFFICE VISIT (OUTPATIENT)
Dept: MATERNAL FETAL MEDICINE | Facility: CLINIC | Age: 34
End: 2024-12-20
Payer: COMMERCIAL

## 2024-12-20 VITALS
BODY MASS INDEX: 37.54 KG/M2 | HEIGHT: 62 IN | HEART RATE: 76 BPM | WEIGHT: 204 LBS | DIASTOLIC BLOOD PRESSURE: 68 MMHG | SYSTOLIC BLOOD PRESSURE: 111 MMHG

## 2024-12-20 DIAGNOSIS — E66.01 SEVERE OBESITY DUE TO EXCESS CALORIES AFFECTING PREGNANCY IN SECOND TRIMESTER: ICD-10-CM

## 2024-12-20 DIAGNOSIS — B34.3 PARVOVIRUS B19 INFECTION COMPLICATING PREGNANCY: ICD-10-CM

## 2024-12-20 DIAGNOSIS — B34.3 PARVOVIRUS B19 INFECTION COMPLICATING PREGNANCY: Primary | ICD-10-CM

## 2024-12-20 DIAGNOSIS — O99.212 SEVERE OBESITY DUE TO EXCESS CALORIES AFFECTING PREGNANCY IN SECOND TRIMESTER: ICD-10-CM

## 2024-12-20 DIAGNOSIS — O09.522 MULTIGRAVIDA OF ADVANCED MATERNAL AGE IN SECOND TRIMESTER: ICD-10-CM

## 2024-12-20 DIAGNOSIS — O98.519 PARVOVIRUS B19 INFECTION COMPLICATING PREGNANCY: ICD-10-CM

## 2024-12-20 DIAGNOSIS — O98.519 PARVOVIRUS B19 INFECTION COMPLICATING PREGNANCY: Primary | ICD-10-CM

## 2024-12-20 DIAGNOSIS — O09.42 GRAND MULTIPARITY WITH CURRENT PREGNANCY IN SECOND TRIMESTER: ICD-10-CM

## 2024-12-23 DIAGNOSIS — O09.522 MULTIGRAVIDA OF ADVANCED MATERNAL AGE IN SECOND TRIMESTER: Primary | ICD-10-CM

## 2024-12-23 DIAGNOSIS — O98.519 PARVOVIRUS B19 INFECTION COMPLICATING PREGNANCY: ICD-10-CM

## 2024-12-23 DIAGNOSIS — B34.3 PARVOVIRUS B19 INFECTION COMPLICATING PREGNANCY: ICD-10-CM

## 2024-12-31 NOTE — PROGRESS NOTES
Maternal Fetal Medicine Follow Up    Subjective:     Patient ID: [id number]    Chief Complaint: mfm followup w/us      HPI: Amira Kebede is a 34 y.o. female  at 24w2d gestation with Estimated Date of Delivery: 25  who is here for follow-up consultation by M.    She is of advanced maternal age and will be 34 by the PRANAV.  She declined any genetic testing during the pregnancy.  She had elevated BMI of 37 at initial OB visit.  She is grand multiparous with 4 previous term vaginal deliveries.  She denies any history of postpartum hemorrhage and has never needed a blood transfusion.  She is on low-dose aspirin BID for preeclampsia prophylaxis. She had positive parvovirus B19 IgG and IgM on 2024.  She was exposed to her 4 children who had Fifths Disease in mid October, and she had symptoms at the end of October that have resolved. She denies any symptoms at this time.        Interval history since last MFM visit: None.. She denies any leaking fluid, vaginal bleeding, contractions, decreased fetal movement. Denies headaches, visual disturbances, or epigastric pain.    Pregnancy complications include:   Patient Active Problem List   Diagnosis    Multigravida of advanced maternal age in second trimester    Elevated BMI over 35 affecting pregnancy in second trimester    At high risk for complications of intrauterine pregnancy (IUP)    Grand multiparity with current pregnancy in second trimester    Parvovirus B19 infection complicating pregnancy       No changes to medical, surgical, family, social, or obstetric history.    Medications:  Current Outpatient Medications   Medication Instructions    aspirin (ECOTRIN) 81 mg, Oral, 2 times daily, Start after 12 weeks gestation.    EScitalopram oxalate (LEXAPRO) 20 mg    ondansetron (ZOFRAN-ODT) 4 MG TbDL SMARTSI-2 Tablet(s) Sublingual Every 8 Hours PRN    prenatal no115/iron/folic acid (PRENATAL 19 ORAL) Take by mouth.       Review of Systems   12 point review  "of systems conducted, negative except as stated in the history of present illness. See HPI for details.      Objective:     Visit Vitals  /61 (BP Location: Left arm, Patient Position: Sitting)   Pulse 85   Ht 5' 2" (1.575 m)   Wt 92.3 kg (203 lb 6.4 oz)   LMP 2024 (Exact Date)   BMI 37.20 kg/m²        Physical Exam  Vitals and nursing note reviewed.   Constitutional:       Appearance: Normal appearance.      Comments: Increased BMI   HENT:      Head: Normocephalic and atraumatic.      Nose: Nose normal. No congestion.   Cardiovascular:      Rate and Rhythm: Normal rate.   Pulmonary:      Effort: Pulmonary effort is normal.   Skin:     Findings: No rash.   Neurological:      Mental Status: She is alert and oriented to person, place, and time.   Psychiatric:         Mood and Affect: Mood normal.         Behavior: Behavior normal.         Thought Content: Thought content normal.         Judgment: Judgment normal.         Assessment/Plan:     34 y.o.  female with IUP at 24w2d    Advanced maternal age  Normal fetal growth and no anomalies seen on fetal anatomy scan on 12/3/24.  AFV is normal, 2025   Normal MCA Doppler today 2025.    Reviewed risks with AMA, including risks for PTL, FGR, anomalies not seen, aneuploidy and stillbirth at term. She was previously offered and declined amniocentesis .  She also declined cell free DNA.  She is aware of need for  evaluation. She previously declined cell free DNA .    I recommend fetal growth ultrasound around 32-34 weeks if no other obstetrical risk factors.  Additional fetal surveillance as below.  Reviewed fetal kick count instructions.  Consider delivery at 39-40 weeks, but no later than 40 weeks 6 days.       Parvovirus B19 in pregnancy  Normal MCA Doppler today 2025.    Patient declined amniocentesis to check for fetal infection.    We will plan to continue ultrasound surveillance with MCA PSV assessment for 10-12 weeks post " exposure.  We will check once more in 2 weeks.  If no evidence of fetal anemia or hydrops after reassessment in 2 weeks, return to routine obstetric care is appropriate. If MCA dopplers are elevated, PUBS may be considered and further recommendations will be made at that time      Elevated BMI  Body mass index is 37.2 kg/m². With stable weight since last visit, she was advised to continue healthy diet avoiding any excessive weight gain.  Excess weight gain would be associated with gestational hypertension, gestational diabetes and adverse  outcomes, including fetal demise in utero.    Reviewed importance of FKC 3/day and prn with instructions to immediately report any decreased fetal movement.    It is important to lose weight after the pregnancy is over, especially before a future pregnancy. Breastfeeding may be an important tool in reducing the postpartum weight retention. Fetal risks were discussed with short term risk of fetal/ obesity and long term risk of adolescent component of metabolic syndrome.    I recommend 1 hour GCT between 24-28 weeks' gestation.    With elevated BMI, start fetal testing starting around 37 weeks.  Reviewed fetal kick count instructions.      Grand multiparity  Consider using prophylactic dose of uterotonic agents after delivery to reduce the risk of postpartum hemorrhage.    Recommend flagging the chart on admission as high risk for postpartum hemorrhage to have blood type and screened and be prepared in the event that that happens.       Preeclampsia prophylaxis  With her increased risk for preeclampsia, she agreed to continue asa 81 mg BID until 34 6/7 weeks, then decrease to once daily until delivery. Preeclampsia precautions reviewed.     Follow up for 2.5 weeks MFM Followup, MCA; 8 weeks, MFM FU Repeat US.     No future appointments.    JONATHAN Gaston  Discussed above with Dr. Gross who provided recommendations and agreed with plan of care as  above.    This note was created with the assistance of [employer] voice recognition software. There may be transcription errors as a result of using this technology, however minimal. Effort has been made to ensure accuracy of transcription, but any obvious errors or omissions should be clarified with the author of the document.

## 2025-01-03 ENCOUNTER — PROCEDURE VISIT (OUTPATIENT)
Dept: MATERNAL FETAL MEDICINE | Facility: CLINIC | Age: 35
End: 2025-01-03
Payer: COMMERCIAL

## 2025-01-03 ENCOUNTER — OFFICE VISIT (OUTPATIENT)
Dept: MATERNAL FETAL MEDICINE | Facility: CLINIC | Age: 35
End: 2025-01-03
Payer: COMMERCIAL

## 2025-01-03 VITALS
WEIGHT: 203.38 LBS | SYSTOLIC BLOOD PRESSURE: 105 MMHG | HEART RATE: 85 BPM | BODY MASS INDEX: 37.43 KG/M2 | DIASTOLIC BLOOD PRESSURE: 61 MMHG | HEIGHT: 62 IN

## 2025-01-03 DIAGNOSIS — E66.01 SEVERE OBESITY DUE TO EXCESS CALORIES AFFECTING PREGNANCY IN SECOND TRIMESTER: ICD-10-CM

## 2025-01-03 DIAGNOSIS — O09.522 MULTIGRAVIDA OF ADVANCED MATERNAL AGE IN SECOND TRIMESTER: ICD-10-CM

## 2025-01-03 DIAGNOSIS — B34.3 PARVOVIRUS B19 INFECTION COMPLICATING PREGNANCY: ICD-10-CM

## 2025-01-03 DIAGNOSIS — O99.212 SEVERE OBESITY DUE TO EXCESS CALORIES AFFECTING PREGNANCY IN SECOND TRIMESTER: ICD-10-CM

## 2025-01-03 DIAGNOSIS — O98.519 PARVOVIRUS B19 INFECTION COMPLICATING PREGNANCY: ICD-10-CM

## 2025-01-03 DIAGNOSIS — O98.519 PARVOVIRUS B19 INFECTION COMPLICATING PREGNANCY: Primary | ICD-10-CM

## 2025-01-03 DIAGNOSIS — O09.42 GRAND MULTIPARITY WITH CURRENT PREGNANCY IN SECOND TRIMESTER: ICD-10-CM

## 2025-01-03 DIAGNOSIS — B34.3 PARVOVIRUS B19 INFECTION COMPLICATING PREGNANCY: Primary | ICD-10-CM

## 2025-01-03 PROCEDURE — 76821 MIDDLE CEREBRAL ARTERY ECHO: CPT | Mod: S$GLB,,, | Performed by: OBSTETRICS & GYNECOLOGY

## 2025-01-09 DIAGNOSIS — O09.522 MULTIGRAVIDA OF ADVANCED MATERNAL AGE IN SECOND TRIMESTER: ICD-10-CM

## 2025-01-09 DIAGNOSIS — O98.519 PARVOVIRUS B19 INFECTION COMPLICATING PREGNANCY: Primary | ICD-10-CM

## 2025-01-09 DIAGNOSIS — O99.212 SEVERE OBESITY DUE TO EXCESS CALORIES AFFECTING PREGNANCY IN SECOND TRIMESTER: ICD-10-CM

## 2025-01-09 DIAGNOSIS — E66.01 SEVERE OBESITY DUE TO EXCESS CALORIES AFFECTING PREGNANCY IN SECOND TRIMESTER: ICD-10-CM

## 2025-01-09 DIAGNOSIS — B34.3 PARVOVIRUS B19 INFECTION COMPLICATING PREGNANCY: Primary | ICD-10-CM

## 2025-01-23 NOTE — PROGRESS NOTES
Maternal Fetal Medicine Follow Up    Subjective:     Patient ID: [id number]    Chief Complaint: M follow up with US      HPI: Amira Kebede is a 34 y.o. female  at 27w2d gestation with Estimated Date of Delivery: 25  who is here for follow-up consultation by MFM.    She is of advanced maternal age and will be 34 by the PRANAV.  She declined any genetic testing during pregnancy.  She had elevated BMI of 37 at initial OB visit.  She is grand multiparous with 4 previous term vaginal deliveries.  She denies any history of postpartum hemorrhage and has never needed a blood transfusion.  She is on low-dose aspirin BID for preeclampsia prophylaxis. She had positive parvovirus B19 IgG and IgM on 2024.  She was exposed to her 4 children who had Fifths Disease in mid October, and she had symptoms at the end of October that have resolved. She denies any symptoms at this time.        Interval history since last MFM visit: None.. She denies any leaking fluid, vaginal bleeding, contractions, decreased fetal movement. Denies headaches, visual disturbances, or epigastric pain.    Pregnancy complications include:   Patient Active Problem List   Diagnosis    Multigravida of advanced maternal age in second trimester    Elevated BMI over 35 affecting pregnancy in second trimester    At high risk for complications of intrauterine pregnancy (IUP)    Grand multiparity with current pregnancy in second trimester    Parvovirus B19 infection complicating pregnancy       No changes to medical, surgical, family, social, or obstetric history.    Medications:  Current Outpatient Medications   Medication Instructions    aspirin (ECOTRIN) 81 mg, Oral, 2 times daily, Start after 12 weeks gestation.    EScitalopram oxalate (LEXAPRO) 20 mg    ondansetron (ZOFRAN-ODT) 4 MG TbDL SMARTSI-2 Tablet(s) Sublingual Every 8 Hours PRN    prenatal no115/iron/folic acid (PRENATAL 19 ORAL) Take by mouth.       Review of Systems   12 point review  "of systems conducted, negative except as stated in the history of present illness. See HPI for details.      Objective:     Visit Vitals  /66 (BP Location: Left arm, Patient Position: Sitting)   Pulse 93   Ht 5' 2" (1.575 m)   Wt 93 kg (205 lb)   LMP 2024 (Exact Date)   BMI 37.49 kg/m²        Physical Exam  Vitals and nursing note reviewed.   Constitutional:       Appearance: Normal appearance.      Comments: Increased BMI   HENT:      Head: Normocephalic and atraumatic.      Nose: Nose normal. No congestion.   Cardiovascular:      Rate and Rhythm: Normal rate.   Pulmonary:      Effort: Pulmonary effort is normal.   Skin:     Findings: No rash.   Neurological:      Mental Status: She is alert and oriented to person, place, and time.   Psychiatric:         Mood and Affect: Mood normal.         Behavior: Behavior normal.         Thought Content: Thought content normal.         Judgment: Judgment normal.         Assessment/Plan:     34 y.o.  female with IUP at 27w2d    Advanced maternal age  Normal fetal growth and no anomalies seen on fetal anatomy scan on 12/3/24.  AFV is normal, 2025   Normal MCA Doppler today 2025.    Reviewed risks with AMA, including risks for PTL, FGR, anomalies not seen, aneuploidy and stillbirth at term. She was previously offered and declined amniocentesis .  She also declined cell free DNA.  She is aware of need for  evaluation. She previously declined cell free DNA .    I recommend fetal growth ultrasound around 32-34 weeks.  Reviewed fetal kick count instructions.    Consider delivery at 39-40 weeks, but no later than 40 weeks 6 days.       Parvovirus B19 in pregnancy  Normal MCA Doppler today 2025.    Patient declined amniocentesis to check for fetal infection.    With no evidence of fetal anemia or hydrops after serial monitoring for the last 12 weeks, return to routine obstetric care is appropriate at this time.      Elevated BMI  Body mass " index is 37.49 kg/m². With stable weight since last visit, she was advised to continue healthy diet avoiding any excessive weight gain.  Excess weight gain would be associated with gestational hypertension, gestational diabetes and adverse  outcomes, including fetal demise in utero.    Reviewed importance of FKC 3/day and prn with instructions to immediately report any decreased fetal movement.    It is important to lose weight after the pregnancy is over, especially before a future pregnancy. Breastfeeding may be an important tool in reducing the postpartum weight retention. Fetal risks were discussed with short term risk of fetal/ obesity and long term risk of adolescent component of metabolic syndrome.    With elevated BMI, start fetal testing starting around 37 weeks.  Reviewed fetal kick count instructions.      Grand multiparity  Consider using prophylactic dose of uterotonic agents after delivery to reduce the risk of postpartum hemorrhage.    Recommend flagging the chart on admission as high risk for postpartum hemorrhage to have blood type and screened and be prepared in the event that that happens.       Preeclampsia prophylaxis  With her increased risk for preeclampsia, she agreed to continue asa 81 mg BID until 34 6/7 weeks, then decrease to once daily until delivery. Preeclampsia precautions reviewed.       MCA Doppler was normal showing no evidence of significant fetal anemia.  This is about 12 weeks from exposure, beyond the risk of any risk of significant fetal anemia.  No further MCA Dopplers we will be done at this time.  Patient will continue aspirin b.i.d. we will plan to see her again in about 5 weeks to check interval growth.    Follow up in about 5 weeks (around 2025) for MFM Followup, Repeat Ultrasound.     Future Appointments   Date Time Provider Department Center   2025  8:30 AM Reginald Gross MD [facility] [place]   2025  8:30 AM ROOM 1, [facility]  [place]     Patient was evaluated and examined by Dr. Gross. LICHA Hillman, helped in pre charting of part of note.     This note was created with the assistance of [employer] voice recognition software. There may be transcription errors as a result of using this technology, however minimal. Effort has been made to ensure accuracy of transcription, but any obvious errors or omissions should be clarified with the author of the document.

## 2025-01-24 ENCOUNTER — PROCEDURE VISIT (OUTPATIENT)
Dept: MATERNAL FETAL MEDICINE | Facility: CLINIC | Age: 35
End: 2025-01-24
Payer: COMMERCIAL

## 2025-01-24 ENCOUNTER — OFFICE VISIT (OUTPATIENT)
Dept: MATERNAL FETAL MEDICINE | Facility: CLINIC | Age: 35
End: 2025-01-24
Payer: COMMERCIAL

## 2025-01-24 VITALS
HEART RATE: 93 BPM | SYSTOLIC BLOOD PRESSURE: 104 MMHG | BODY MASS INDEX: 37.73 KG/M2 | WEIGHT: 205 LBS | DIASTOLIC BLOOD PRESSURE: 66 MMHG | HEIGHT: 62 IN

## 2025-01-24 DIAGNOSIS — O09.522 MULTIGRAVIDA OF ADVANCED MATERNAL AGE IN SECOND TRIMESTER: ICD-10-CM

## 2025-01-24 DIAGNOSIS — B34.3 PARVOVIRUS B19 INFECTION COMPLICATING PREGNANCY: Primary | ICD-10-CM

## 2025-01-24 DIAGNOSIS — O99.212 SEVERE OBESITY DUE TO EXCESS CALORIES AFFECTING PREGNANCY IN SECOND TRIMESTER: ICD-10-CM

## 2025-01-24 DIAGNOSIS — O98.519 PARVOVIRUS B19 INFECTION COMPLICATING PREGNANCY: Primary | ICD-10-CM

## 2025-01-24 DIAGNOSIS — E66.01 SEVERE OBESITY DUE TO EXCESS CALORIES AFFECTING PREGNANCY IN SECOND TRIMESTER: ICD-10-CM

## 2025-01-24 DIAGNOSIS — O09.42 GRAND MULTIPARITY WITH CURRENT PREGNANCY IN SECOND TRIMESTER: ICD-10-CM

## 2025-01-24 DIAGNOSIS — B34.3 PARVOVIRUS B19 INFECTION COMPLICATING PREGNANCY: ICD-10-CM

## 2025-01-24 DIAGNOSIS — O98.519 PARVOVIRUS B19 INFECTION COMPLICATING PREGNANCY: ICD-10-CM

## 2025-01-24 PROCEDURE — 1159F MED LIST DOCD IN RCRD: CPT | Mod: CPTII,S$GLB,, | Performed by: OBSTETRICS & GYNECOLOGY

## 2025-01-24 PROCEDURE — 99213 OFFICE O/P EST LOW 20 MIN: CPT | Mod: S$GLB,,, | Performed by: OBSTETRICS & GYNECOLOGY

## 2025-01-24 PROCEDURE — 76821 MIDDLE CEREBRAL ARTERY ECHO: CPT | Mod: S$GLB,,, | Performed by: OBSTETRICS & GYNECOLOGY

## 2025-01-24 PROCEDURE — 3008F BODY MASS INDEX DOCD: CPT | Mod: CPTII,S$GLB,, | Performed by: OBSTETRICS & GYNECOLOGY

## 2025-01-24 PROCEDURE — 3078F DIAST BP <80 MM HG: CPT | Mod: CPTII,S$GLB,, | Performed by: OBSTETRICS & GYNECOLOGY

## 2025-01-24 PROCEDURE — 1160F RVW MEDS BY RX/DR IN RCRD: CPT | Mod: CPTII,S$GLB,, | Performed by: OBSTETRICS & GYNECOLOGY

## 2025-01-24 PROCEDURE — 3074F SYST BP LT 130 MM HG: CPT | Mod: CPTII,S$GLB,, | Performed by: OBSTETRICS & GYNECOLOGY

## 2025-02-13 DIAGNOSIS — Z36.89 ENCOUNTER FOR ULTRASOUND TO ASSESS FETAL GROWTH: ICD-10-CM

## 2025-02-13 DIAGNOSIS — O98.519 PARVOVIRUS B19 INFECTION COMPLICATING PREGNANCY: Primary | ICD-10-CM

## 2025-02-13 DIAGNOSIS — B34.3 PARVOVIRUS B19 INFECTION COMPLICATING PREGNANCY: Primary | ICD-10-CM

## 2025-02-13 DIAGNOSIS — O09.522 MULTIGRAVIDA OF ADVANCED MATERNAL AGE IN SECOND TRIMESTER: ICD-10-CM

## 2025-02-24 PROBLEM — O99.213 SEVERE OBESITY DUE TO EXCESS CALORIES AFFECTING PREGNANCY IN THIRD TRIMESTER: Status: ACTIVE | Noted: 2024-10-21

## 2025-02-24 PROBLEM — O09.43 GRAND MULTIPARITY WITH CURRENT PREGNANCY IN THIRD TRIMESTER: Status: ACTIVE | Noted: 2024-10-21

## 2025-02-24 PROBLEM — O09.523 MULTIGRAVIDA OF ADVANCED MATERNAL AGE IN THIRD TRIMESTER: Status: ACTIVE | Noted: 2024-10-21

## 2025-02-24 NOTE — PROGRESS NOTES
Maternal Fetal Medicine Follow Up    Subjective:     Patient ID: [id number]    Chief Complaint: mfm followup w/us      HPI: Amira Kebede is a 34 y.o. female  at 32w2d gestation with Estimated Date of Delivery: 25  who is here for follow-up consultation by M.    She is of advanced maternal age and will be 34 by the PRANAV.  She declined any genetic testing during pregnancy.  She had elevated BMI of 37 at initial OB visit.  She is grand multiparous with 4 previous term vaginal deliveries.  She denies any history of postpartum hemorrhage and has never needed a blood transfusion.  She is on low-dose aspirin BID for preeclampsia prophylaxis. She had positive parvovirus B19 IgG and IgM on 2024.  She was exposed to her 4 children who had Fifths Disease in mid October, and she had symptoms at the end of October that have resolved. She denies any symptoms at this time.  She had surveillance for 12 weeks post exposure that was reassuring.       Interval history since last MFM visit: None.. She denies any leaking fluid, vaginal bleeding, contractions, decreased fetal movement. Denies headaches, visual disturbances, or epigastric pain.    Pregnancy complications include:   Patient Active Problem List   Diagnosis    Multigravida of advanced maternal age in third trimester    BMI>35  affecting pregnancy in third trimester    At high risk for complications of intrauterine pregnancy (IUP)    Grand multiparity with current pregnancy in third trimester    Parvovirus B19 infection complicating pregnancy       No changes to medical, surgical, family, social, or obstetric history.    Medications:  Current Outpatient Medications   Medication Instructions    aspirin (ECOTRIN) 81 mg, Oral, 2 times daily, Start after 12 weeks gestation.    EScitalopram oxalate (LEXAPRO) 20 mg    ondansetron (ZOFRAN-ODT) 4 MG TbDL SMARTSI-2 Tablet(s) Sublingual Every 8 Hours PRN    prenatal no115/iron/folic acid (PRENATAL 19 ORAL) Take by  "mouth.       Review of Systems   12 point review of systems conducted, negative except as stated in the history of present illness. See HPI for details.      Objective:     Visit Vitals  /62 (BP Location: Left arm, Patient Position: Sitting)   Pulse 92   Ht 5' 2" (1.575 m)   Wt 94.6 kg (208 lb 9.6 oz)   LMP 2024 (Exact Date)   BMI 38.15 kg/m²        Physical Exam  Vitals and nursing note reviewed.   Constitutional:       Appearance: Normal appearance.      Comments: Increased BMI   HENT:      Head: Normocephalic and atraumatic.      Nose: Nose normal. No congestion.   Cardiovascular:      Rate and Rhythm: Normal rate.   Pulmonary:      Effort: Pulmonary effort is normal.   Skin:     Findings: No rash.   Neurological:      Mental Status: She is alert and oriented to person, place, and time.   Psychiatric:         Mood and Affect: Mood normal.         Behavior: Behavior normal.         Thought Content: Thought content normal.         Judgment: Judgment normal.         Assessment/Plan:     34 y.o.  female with IUP at 32w2d    Advanced maternal age  There is upper normal fetal growth with an EFW of 2249 g at the 48% and the AC at the 97% on 25.  AFV is normal. BPP is 8/8 today (2025).     Reviewed risks with AMA, including risks for PTL, FGR, anomalies not seen, aneuploidy and stillbirth at term. She was previously offered and declined amniocentesis .  She also declined cell free DNA.  She is aware of need for  evaluation. She previously declined cell free DNA .    With a upper normal fetal growth and no evidence of fetal growth restriction, no follow-up appointment was scheduled at this time.  Reviewed fetal kick count instructions.    Consider delivery at 39-40 weeks, but no later than 40 weeks 6 days.     With a BMI currently over 35 and advanced age to be reasonable to do some fetal testing somewhere around 34 weeks' gestation.  That could be done weekly with . follow-up was " scheduled at this time.      Parvovirus B19 in pregnancy  Patient declined amniocentesis to check for fetal infection.  With no evidence of fetal anemia or hydrops after serial monitoring for 12 weeks post exposure,, routine obstetric care is appropriate at this time.      Elevated BMI  Body mass index is 38.15 kg/m².  With a reasonable weight over last 2-1/2 month of around 5 lb. she was advised to continue healthy diet avoiding any excessive weight gain.  Excess weight gain would be associated with gestational hypertension, gestational diabetes and adverse  outcomes, including fetal demise in utero.    Reviewed importance of FKC 3/day and prn with instructions to immediately report any decreased fetal movement.    It is important to lose weight after the pregnancy is over, especially before a future pregnancy. Breastfeeding may be an important tool in reducing the postpartum weight retention. Fetal risks were discussed with short term risk of fetal/ obesity and long term risk of adolescent component of metabolic syndrome.    With elevated BMI, start fetal testing starting around 37 weeks, which could be done weekly with primary OB.  Reviewed fetal kick count instructions.      Grand multiparity  Consider using prophylactic dose of uterotonic agents after delivery to reduce the risk of postpartum hemorrhage.    Recommend flagging the chart on admission as high risk for postpartum hemorrhage to have blood type and screened and be prepared in the event that that happens.       Preeclampsia prophylaxis  With her increased risk for preeclampsia, she agreed to continue asa 81 mg BID until 34 6/7 weeks, then decrease to once daily until delivery. Preeclampsia precautions reviewed.     No follow-ups on file.     No future appointments.    Patient was evaluated and examined by Dr. Gross. LICHA Hillman, helped in pre charting of part of note.     This note was created with the assistance of [employer] voice  recognition software. There may be transcription errors as a result of using this technology, however minimal. Effort has been made to ensure accuracy of transcription, but any obvious errors or omissions should be clarified with the author of the document.

## 2025-02-28 ENCOUNTER — PROCEDURE VISIT (OUTPATIENT)
Dept: MATERNAL FETAL MEDICINE | Facility: CLINIC | Age: 35
End: 2025-02-28
Payer: COMMERCIAL

## 2025-02-28 ENCOUNTER — OFFICE VISIT (OUTPATIENT)
Dept: MATERNAL FETAL MEDICINE | Facility: CLINIC | Age: 35
End: 2025-02-28
Payer: COMMERCIAL

## 2025-02-28 VITALS
HEIGHT: 62 IN | BODY MASS INDEX: 38.39 KG/M2 | WEIGHT: 208.63 LBS | SYSTOLIC BLOOD PRESSURE: 116 MMHG | HEART RATE: 92 BPM | DIASTOLIC BLOOD PRESSURE: 62 MMHG

## 2025-02-28 DIAGNOSIS — O09.523 MULTIGRAVIDA OF ADVANCED MATERNAL AGE IN THIRD TRIMESTER: ICD-10-CM

## 2025-02-28 DIAGNOSIS — O09.522 MULTIGRAVIDA OF ADVANCED MATERNAL AGE IN SECOND TRIMESTER: ICD-10-CM

## 2025-02-28 DIAGNOSIS — O99.213 SEVERE OBESITY DUE TO EXCESS CALORIES AFFECTING PREGNANCY IN THIRD TRIMESTER: ICD-10-CM

## 2025-02-28 DIAGNOSIS — O98.519 PARVOVIRUS B19 INFECTION COMPLICATING PREGNANCY: ICD-10-CM

## 2025-02-28 DIAGNOSIS — O98.519 PARVOVIRUS B19 INFECTION COMPLICATING PREGNANCY: Primary | ICD-10-CM

## 2025-02-28 DIAGNOSIS — B34.3 PARVOVIRUS B19 INFECTION COMPLICATING PREGNANCY: ICD-10-CM

## 2025-02-28 DIAGNOSIS — Z36.89 ENCOUNTER FOR ULTRASOUND TO ASSESS FETAL GROWTH: ICD-10-CM

## 2025-02-28 DIAGNOSIS — E66.01 SEVERE OBESITY DUE TO EXCESS CALORIES AFFECTING PREGNANCY IN THIRD TRIMESTER: ICD-10-CM

## 2025-02-28 DIAGNOSIS — B34.3 PARVOVIRUS B19 INFECTION COMPLICATING PREGNANCY: Primary | ICD-10-CM

## 2025-02-28 DIAGNOSIS — O09.43 GRAND MULTIPARITY WITH CURRENT PREGNANCY IN THIRD TRIMESTER: ICD-10-CM
